# Patient Record
Sex: MALE | Race: BLACK OR AFRICAN AMERICAN | NOT HISPANIC OR LATINO | URBAN - METROPOLITAN AREA
[De-identification: names, ages, dates, MRNs, and addresses within clinical notes are randomized per-mention and may not be internally consistent; named-entity substitution may affect disease eponyms.]

---

## 2021-02-09 ENCOUNTER — INPATIENT (INPATIENT)
Facility: HOSPITAL | Age: 56
LOS: 2 days | Discharge: HOME | DRG: 378 | End: 2021-02-12
Attending: INTERNAL MEDICINE | Admitting: INTERNAL MEDICINE
Payer: COMMERCIAL

## 2021-02-09 VITALS
HEART RATE: 97 BPM | WEIGHT: 250 LBS | TEMPERATURE: 98 F | SYSTOLIC BLOOD PRESSURE: 117 MMHG | DIASTOLIC BLOOD PRESSURE: 80 MMHG | OXYGEN SATURATION: 98 % | RESPIRATION RATE: 16 BRPM

## 2021-02-09 DIAGNOSIS — K92.2 GASTROINTESTINAL HEMORRHAGE, UNSPECIFIED: ICD-10-CM

## 2021-02-09 LAB
ALBUMIN SERPL ELPH-MCNC: 3.3 G/DL — SIGNIFICANT CHANGE UP (ref 3.3–5)
ALP SERPL-CCNC: 60 U/L — SIGNIFICANT CHANGE UP (ref 40–120)
ALT FLD-CCNC: 40 U/L — SIGNIFICANT CHANGE UP (ref 12–78)
ANION GAP SERPL CALC-SCNC: 8 MMOL/L — SIGNIFICANT CHANGE UP (ref 5–17)
APTT BLD: 30.6 SEC — SIGNIFICANT CHANGE UP (ref 27.5–35.5)
AST SERPL-CCNC: 19 U/L — SIGNIFICANT CHANGE UP (ref 15–37)
BASOPHILS # BLD AUTO: 0.04 K/UL — SIGNIFICANT CHANGE UP (ref 0–0.2)
BASOPHILS NFR BLD AUTO: 0.5 % — SIGNIFICANT CHANGE UP (ref 0–2)
BILIRUB SERPL-MCNC: 0.4 MG/DL — SIGNIFICANT CHANGE UP (ref 0.2–1.2)
BUN SERPL-MCNC: 18 MG/DL — SIGNIFICANT CHANGE UP (ref 7–23)
CALCIUM SERPL-MCNC: 8.4 MG/DL — LOW (ref 8.5–10.1)
CHLORIDE SERPL-SCNC: 110 MMOL/L — HIGH (ref 96–108)
CO2 SERPL-SCNC: 22 MMOL/L — SIGNIFICANT CHANGE UP (ref 22–31)
CREAT SERPL-MCNC: 1.08 MG/DL — SIGNIFICANT CHANGE UP (ref 0.5–1.3)
EOSINOPHIL # BLD AUTO: 0.12 K/UL — SIGNIFICANT CHANGE UP (ref 0–0.5)
EOSINOPHIL NFR BLD AUTO: 1.4 % — SIGNIFICANT CHANGE UP (ref 0–6)
GLUCOSE SERPL-MCNC: 130 MG/DL — HIGH (ref 70–99)
HCT VFR BLD CALC: 34.4 % — LOW (ref 39–50)
HCT VFR BLD CALC: 36.6 % — LOW (ref 39–50)
HGB BLD-MCNC: 11.9 G/DL — LOW (ref 13–17)
HGB BLD-MCNC: 12.8 G/DL — LOW (ref 13–17)
IMM GRANULOCYTES NFR BLD AUTO: 0.2 % — SIGNIFICANT CHANGE UP (ref 0–1.5)
INR BLD: 1.22 RATIO — HIGH (ref 0.88–1.16)
LYMPHOCYTES # BLD AUTO: 1.72 K/UL — SIGNIFICANT CHANGE UP (ref 1–3.3)
LYMPHOCYTES # BLD AUTO: 20.6 % — SIGNIFICANT CHANGE UP (ref 13–44)
MCHC RBC-ENTMCNC: 27.6 PG — SIGNIFICANT CHANGE UP (ref 27–34)
MCHC RBC-ENTMCNC: 35 GM/DL — SIGNIFICANT CHANGE UP (ref 32–36)
MCV RBC AUTO: 78.9 FL — LOW (ref 80–100)
MONOCYTES # BLD AUTO: 0.64 K/UL — SIGNIFICANT CHANGE UP (ref 0–0.9)
MONOCYTES NFR BLD AUTO: 7.7 % — SIGNIFICANT CHANGE UP (ref 2–14)
NEUTROPHILS # BLD AUTO: 5.81 K/UL — SIGNIFICANT CHANGE UP (ref 1.8–7.4)
NEUTROPHILS NFR BLD AUTO: 69.6 % — SIGNIFICANT CHANGE UP (ref 43–77)
PLATELET # BLD AUTO: 248 K/UL — SIGNIFICANT CHANGE UP (ref 150–400)
POTASSIUM SERPL-MCNC: 4 MMOL/L — SIGNIFICANT CHANGE UP (ref 3.5–5.3)
POTASSIUM SERPL-SCNC: 4 MMOL/L — SIGNIFICANT CHANGE UP (ref 3.5–5.3)
PROT SERPL-MCNC: 7.1 GM/DL — SIGNIFICANT CHANGE UP (ref 6–8.3)
PROTHROM AB SERPL-ACNC: 14.1 SEC — HIGH (ref 10.6–13.6)
RAPID RVP RESULT: SIGNIFICANT CHANGE UP
RBC # BLD: 4.64 M/UL — SIGNIFICANT CHANGE UP (ref 4.2–5.8)
RBC # FLD: 15.6 % — HIGH (ref 10.3–14.5)
SARS-COV-2 RNA SPEC QL NAA+PROBE: SIGNIFICANT CHANGE UP
SODIUM SERPL-SCNC: 140 MMOL/L — SIGNIFICANT CHANGE UP (ref 135–145)
WBC # BLD: 8.35 K/UL — SIGNIFICANT CHANGE UP (ref 3.8–10.5)
WBC # FLD AUTO: 8.35 K/UL — SIGNIFICANT CHANGE UP (ref 3.8–10.5)

## 2021-02-09 PROCEDURE — 85027 COMPLETE CBC AUTOMATED: CPT

## 2021-02-09 PROCEDURE — 80048 BASIC METABOLIC PNL TOTAL CA: CPT

## 2021-02-09 PROCEDURE — 88312 SPECIAL STAINS GROUP 1: CPT

## 2021-02-09 PROCEDURE — 93010 ELECTROCARDIOGRAM REPORT: CPT

## 2021-02-09 PROCEDURE — 88342 IMHCHEM/IMCYTCHM 1ST ANTB: CPT

## 2021-02-09 PROCEDURE — 86803 HEPATITIS C AB TEST: CPT

## 2021-02-09 PROCEDURE — 36415 COLL VENOUS BLD VENIPUNCTURE: CPT

## 2021-02-09 PROCEDURE — 88305 TISSUE EXAM BY PATHOLOGIST: CPT

## 2021-02-09 PROCEDURE — 74174 CTA ABD&PLVS W/CONTRAST: CPT | Mod: 26

## 2021-02-09 PROCEDURE — 86769 SARS-COV-2 COVID-19 ANTIBODY: CPT

## 2021-02-09 RX ORDER — SODIUM CHLORIDE 9 MG/ML
2000 INJECTION INTRAMUSCULAR; INTRAVENOUS; SUBCUTANEOUS ONCE
Refills: 0 | Status: COMPLETED | OUTPATIENT
Start: 2021-02-09 | End: 2021-02-09

## 2021-02-09 RX ORDER — SODIUM CHLORIDE 9 MG/ML
500 INJECTION INTRAMUSCULAR; INTRAVENOUS; SUBCUTANEOUS ONCE
Refills: 0 | Status: COMPLETED | OUTPATIENT
Start: 2021-02-09 | End: 2021-02-09

## 2021-02-09 RX ADMIN — SODIUM CHLORIDE 1000 MILLILITER(S): 9 INJECTION INTRAMUSCULAR; INTRAVENOUS; SUBCUTANEOUS at 23:19

## 2021-02-09 RX ADMIN — SODIUM CHLORIDE 500 MILLILITER(S): 9 INJECTION INTRAMUSCULAR; INTRAVENOUS; SUBCUTANEOUS at 19:10

## 2021-02-09 RX ADMIN — SODIUM CHLORIDE 500 MILLILITER(S): 9 INJECTION INTRAMUSCULAR; INTRAVENOUS; SUBCUTANEOUS at 18:10

## 2021-02-09 NOTE — ED ADULT NURSE NOTE - OBJECTIVE STATEMENT
pt c/o rectal bleeding that began this AM. Pt endorses constipation and hard stools for which he was placed on stool softener by . will ctm

## 2021-02-09 NOTE — ED PROVIDER NOTE - OBJECTIVE STATEMENT
55 y/o male with PMHx of HTN presents to the ED c/o rectal bleeding. Pt reports BRBPR x 6 episodes started last night. Pt states he woke up at 2:30 AM feeling like he had diarrhea and had an episode of loose stool with BRBPR. Subsequently had 5 more episodes, and felt a little weak and had to ease himself to the floor. Pt was able to get back to bed and went to sleep. Since waking up today has had no further episodes. Denies rectal pain, and fever. No similar sx in past. Does report mild RLQ pain. pain is non radiating and rated it a 5/10. Pt did have hx of colonoscopy in 2017. Normal as per pt.

## 2021-02-09 NOTE — ED PROVIDER NOTE - NS_ ATTENDINGSCRIBEDETAILS _ED_A_ED_FT
Gena Harris MD: The history, relevant review of systems, past medical and surgical history, medical decision making, and physical examination was documented by the scribe in my presence and I attest to the accuracy of the documentation.

## 2021-02-09 NOTE — ED PROVIDER NOTE - CLINICAL SUMMARY MEDICAL DECISION MAKING FREE TEXT BOX
55 y/o with lower GI bleed and abdomen pain. Plan: labs, CTA abdomen and pelvis, fluids, and reassess.

## 2021-02-09 NOTE — ED ADULT NURSE NOTE - CHIEF COMPLAINT QUOTE
Pt p/w c/o BRBPR x 6 times today assc RLQ pain.  Denies blood thinner use, chest pain, shortness of breath.

## 2021-02-09 NOTE — ED STATDOCS - PROGRESS NOTE DETAILS
Zeynep Montes De Oca for attending Dr. Adair: 55 y/o male with a PMHx of HTN presents to the ED c/o rectal bleeding x6 days. Pt notes BRBPR and associated RLQ abdominal pain. Pt also reports sitting on the toilet and attempting to get up and feeling lightheaded and falling. Legs went weak and fell to floor after second episode of rectal bleeding. Notes having 3 more episodes after. Denies head strike, LOC. Notes multiple episodes of BRBPR. Denies chest pain, SOB. Not on blood thinners. No other complaints at this time.  Since pt feeling dizzy, will send pt to main ED for further evaluation.

## 2021-02-09 NOTE — ED PROVIDER NOTE - PROGRESS NOTE DETAILS
Zeynep LESLIE for ED attending, Dr. Villarreal has no bleeding here in the ED. However, pt hasn't ad any PO intake. Pt prefers to go home if medically appropriate. Will give pure liquids, PO challenge, and repeat H&H. Kimberly KILPATRICK: sign out given to Hospitalist, Dr. Iniguez. will admit for lower gi bleed.

## 2021-02-10 LAB
ANION GAP SERPL CALC-SCNC: 3 MMOL/L — LOW (ref 5–17)
BUN SERPL-MCNC: 12 MG/DL — SIGNIFICANT CHANGE UP (ref 7–23)
CALCIUM SERPL-MCNC: 8.4 MG/DL — LOW (ref 8.5–10.1)
CHLORIDE SERPL-SCNC: 115 MMOL/L — HIGH (ref 96–108)
CO2 SERPL-SCNC: 28 MMOL/L — SIGNIFICANT CHANGE UP (ref 22–31)
CREAT SERPL-MCNC: 0.91 MG/DL — SIGNIFICANT CHANGE UP (ref 0.5–1.3)
GLUCOSE SERPL-MCNC: 98 MG/DL — SIGNIFICANT CHANGE UP (ref 70–99)
HCT VFR BLD CALC: 29.3 % — LOW (ref 39–50)
HCT VFR BLD CALC: 32.2 % — LOW (ref 39–50)
HGB BLD-MCNC: 10.1 G/DL — LOW (ref 13–17)
HGB BLD-MCNC: 10.9 G/DL — LOW (ref 13–17)
MCHC RBC-ENTMCNC: 27.2 PG — SIGNIFICANT CHANGE UP (ref 27–34)
MCHC RBC-ENTMCNC: 27.7 PG — SIGNIFICANT CHANGE UP (ref 27–34)
MCHC RBC-ENTMCNC: 33.9 GM/DL — SIGNIFICANT CHANGE UP (ref 32–36)
MCHC RBC-ENTMCNC: 34.5 GM/DL — SIGNIFICANT CHANGE UP (ref 32–36)
MCV RBC AUTO: 80.3 FL — SIGNIFICANT CHANGE UP (ref 80–100)
MCV RBC AUTO: 80.3 FL — SIGNIFICANT CHANGE UP (ref 80–100)
PLATELET # BLD AUTO: 207 K/UL — SIGNIFICANT CHANGE UP (ref 150–400)
PLATELET # BLD AUTO: 211 K/UL — SIGNIFICANT CHANGE UP (ref 150–400)
POTASSIUM SERPL-MCNC: 3.9 MMOL/L — SIGNIFICANT CHANGE UP (ref 3.5–5.3)
POTASSIUM SERPL-SCNC: 3.9 MMOL/L — SIGNIFICANT CHANGE UP (ref 3.5–5.3)
RBC # BLD: 3.65 M/UL — LOW (ref 4.2–5.8)
RBC # BLD: 4.01 M/UL — LOW (ref 4.2–5.8)
RBC # FLD: 15.8 % — HIGH (ref 10.3–14.5)
RBC # FLD: 15.9 % — HIGH (ref 10.3–14.5)
SARS-COV-2 IGG SERPL QL IA: NEGATIVE — SIGNIFICANT CHANGE UP
SARS-COV-2 IGM SERPL IA-ACNC: 0.06 INDEX — SIGNIFICANT CHANGE UP
SODIUM SERPL-SCNC: 146 MMOL/L — HIGH (ref 135–145)
WBC # BLD: 6.05 K/UL — SIGNIFICANT CHANGE UP (ref 3.8–10.5)
WBC # BLD: 6.53 K/UL — SIGNIFICANT CHANGE UP (ref 3.8–10.5)
WBC # FLD AUTO: 6.05 K/UL — SIGNIFICANT CHANGE UP (ref 3.8–10.5)
WBC # FLD AUTO: 6.53 K/UL — SIGNIFICANT CHANGE UP (ref 3.8–10.5)

## 2021-02-10 PROCEDURE — 99223 1ST HOSP IP/OBS HIGH 75: CPT

## 2021-02-10 RX ORDER — ONDANSETRON 8 MG/1
4 TABLET, FILM COATED ORAL EVERY 6 HOURS
Refills: 0 | Status: DISCONTINUED | OUTPATIENT
Start: 2021-02-10 | End: 2021-02-12

## 2021-02-10 RX ORDER — GABAPENTIN 400 MG/1
200 CAPSULE ORAL DAILY
Refills: 0 | Status: DISCONTINUED | OUTPATIENT
Start: 2021-02-10 | End: 2021-02-10

## 2021-02-10 RX ORDER — SOD SULF/SODIUM/NAHCO3/KCL/PEG
2000 SOLUTION, RECONSTITUTED, ORAL ORAL ONCE
Refills: 0 | Status: COMPLETED | OUTPATIENT
Start: 2021-02-10 | End: 2021-02-10

## 2021-02-10 RX ORDER — OXYBUTYNIN CHLORIDE 5 MG
5 TABLET ORAL
Refills: 0 | Status: DISCONTINUED | OUTPATIENT
Start: 2021-02-10 | End: 2021-02-12

## 2021-02-10 RX ORDER — OXYBUTYNIN CHLORIDE 5 MG
10 TABLET ORAL DAILY
Refills: 0 | Status: DISCONTINUED | OUTPATIENT
Start: 2021-02-10 | End: 2021-02-10

## 2021-02-10 RX ORDER — TAMSULOSIN HYDROCHLORIDE 0.4 MG/1
0.4 CAPSULE ORAL AT BEDTIME
Refills: 0 | Status: DISCONTINUED | OUTPATIENT
Start: 2021-02-10 | End: 2021-02-12

## 2021-02-10 RX ORDER — ACETAMINOPHEN 500 MG
650 TABLET ORAL EVERY 6 HOURS
Refills: 0 | Status: DISCONTINUED | OUTPATIENT
Start: 2021-02-10 | End: 2021-02-12

## 2021-02-10 RX ADMIN — Medication 5 MILLIGRAM(S): at 20:39

## 2021-02-10 RX ADMIN — TAMSULOSIN HYDROCHLORIDE 0.4 MILLIGRAM(S): 0.4 CAPSULE ORAL at 20:38

## 2021-02-10 RX ADMIN — Medication 2000 MILLILITER(S): at 18:44

## 2021-02-10 NOTE — H&P ADULT - NSHPPHYSICALEXAM_GEN_ALL_CORE
Vital Signs Last 24 Hrs  T(C): 36.4 (10 Feb 2021 10:09), Max: 36.7 (09 Feb 2021 18:26)  T(F): 97.5 (10 Feb 2021 10:09), Max: 98.1 (09 Feb 2021 18:26)  HR: 71 (10 Feb 2021 10:09) (71 - 97)  BP: 105/71 (10 Feb 2021 10:09) (99/64 - 118/78)  BP(mean): 72 (09 Feb 2021 18:26) (72 - 90)  RR: 18 (10 Feb 2021 10:09) (15 - 18)  SpO2: 97% (10 Feb 2021 10:09) (97% - 100%)        · CONSTITUTIONAL: Well appearing, awake, alert, oriented to person, place, time/situation and in no apparent distress.  · ENMT: Airway patent, Nasal mucosa clear. Mouth with normal mucosa. Throat has no vesicles, no oropharyngeal exudates and uvula is midline.  · EYES: Clear bilaterally, pupils equal, round and reactive to light.  · CARDIAC: Normal rate, regular rhythm.  Heart sounds S1, S2.  No murmurs, rubs or gallops.  · RESPIRATORY: Breath sounds clear and equal bilaterally.  · GASTROINTESTINAL: Abdomen soft, no  + tenderness to RLQ and mid lower abdomen  · MUSCULOSKELETAL: Spine appears normal, range of motion is not limited, no muscle or joint tenderness  · NEUROLOGICAL: Alert and oriented, no focal deficits, no motor or sensory deficits.  · SKIN: Skin normal color for race, warm, dry and intact. No evidence of rash.

## 2021-02-10 NOTE — H&P ADULT - NSHPLABSRESULTS_GEN_ALL_CORE
10.9   6.53  )-----------( 211      ( 10 Feb 2021 10:45 )             32.2     10 Feb 2021 10:45    146    |  115    |  12     ----------------------------<  98     3.9     |  28     |  0.91     Ca    8.4        10 Feb 2021 10:45    TPro  7.1    /  Alb  3.3    /  TBili  0.4    /  DBili  x      /  AST  19     /  ALT  40     /  AlkPhos  60     09 Feb 2021 18:05    LIVER FUNCTIONS - ( 09 Feb 2021 18:05 )  Alb: 3.3 g/dL / Pro: 7.1 gm/dL / ALK PHOS: 60 U/L / ALT: 40 U/L / AST: 19 U/L / GGT: x           PT/INR - ( 09 Feb 2021 18:05 )   PT: 14.1 sec;   INR: 1.22 ratio         PTT - ( 09 Feb 2021 18:05 )  PTT:30.6 sec  CAPILLARY BLOOD GLUCOSE

## 2021-02-10 NOTE — CONSULT NOTE ADULT - SUBJECTIVE AND OBJECTIVE BOX
Patient is a 56y old  Male who presents with a chief complaint of Rectal bleed (10 Feb 2021 11:30)      HPI:   55 y/o male with PMHx of HTN presented to the ED c/o rectal bleeding. Pt reports BRBPR x 6 episodes started 2 days ago. Pt states he woke up very early yesterday  at 2:30 AM feeling like he had diarrhea and had an episode of loose stool with large BRBPR. Subsequently had 5 more episodes, and felt a little weak and had to ease himself to the floor. Pt was able to get back to bed and went to sleep. Since waking up yesterday has had no further episodes. He denies any abd pain, nausea, vomiting, and fever. No similar episodes in the past. He had routine colonoscopy in 2017 by his GI Dr Lopez and was normal as per patient.        (10 Feb 2021 11:30)  Patient states that prior colonoscopy was normal.  Denies any abdominal pain.  Please that the bleeding was bright red blood.    PAST MEDICAL & SURGICAL HISTORY:  HTN (hypertension)    No significant past surgical history        MEDICATIONS  (STANDING):  oxybutynin 5 milliGRAM(s) Oral two times a day  polyethylene glycol/electrolyte Solution 2000 milliLiter(s) Oral once  tamsulosin 0.4 milliGRAM(s) Oral at bedtime    MEDICATIONS  (PRN):  acetaminophen   Tablet .. 650 milliGRAM(s) Oral every 6 hours PRN Temp greater or equal to 38C (100.4F), Mild Pain (1 - 3)  ondansetron Injectable 4 milliGRAM(s) IV Push every 6 hours PRN Nausea and/or Vomiting      Allergies    No Known Allergies    Intolerances        SOCIAL HISTORY:[employer]    FAMILY HISTORY:  NC    REVIEW OF SYSTEMS:    CONSTITUTIONAL: No weakness, fevers or chills  EYES/ENT: No visual changes;  No vertigo or throat pain   NECK: No pain or stiffness  RESPIRATORY: No cough, wheezing, hemoptysis; No shortness of breath  CARDIOVASCULAR: No chest pain or palpitations  GENITOURINARY: No dysuria, frequency or hematuria  NEUROLOGICAL: No numbness or weakness  SKIN: No itching, burning, rashes, or lesions   All other review of systems is negative unless indicated above.    Vital Signs Last 24 Hrs  T(C): 36.6 (10 Feb 2021 16:09), Max: 36.7 (09 Feb 2021 18:26)  T(F): 97.8 (10 Feb 2021 16:09), Max: 98.1 (09 Feb 2021 18:26)  HR: 86 (10 Feb 2021 16:09) (71 - 89)  BP: 107/68 (10 Feb 2021 16:09) (99/64 - 118/78)  BP(mean): 72 (09 Feb 2021 18:26) (72 - 72)  RR: 18 (10 Feb 2021 16:09) (15 - 18)  SpO2: 98% (10 Feb 2021 16:09) (97% - 100%)    PHYSICAL EXAM:    Constitutional: NAD, well-developed  HEENT: EOMI, throat clear  Neck: No LAD, supple  Respiratory: CTA and P  Cardiovascular: S1 and S2, RRR, no M  Gastrointestinal: BS+, soft, NT/ND, neg HSM,  Extremities: No peripheral edema, neg clubing, cyanosis  Vascular: 2+ peripheral pulses  Neurological: A/O x 3, no focal deficits  Psychiatric: Normal mood, normal affect  Skin: No rashes    LABS:  CBC Full  -  ( 10 Feb 2021 10:45 )  WBC Count : 6.53 K/uL  RBC Count : 4.01 M/uL  Hemoglobin : 10.9 g/dL  Hematocrit : 32.2 %  Platelet Count - Automated : 211 K/uL  Mean Cell Volume : 80.3 fl  Mean Cell Hemoglobin : 27.2 pg  Mean Cell Hemoglobin Concentration : 33.9 gm/dL  Auto Neutrophil # : x  Auto Lymphocyte # : x  Auto Monocyte # : x  Auto Eosinophil # : x  Auto Basophil # : x  Auto Neutrophil % : x  Auto Lymphocyte % : x  Auto Monocyte % : x  Auto Eosinophil % : x  Auto Basophil % : x    02-10    146<H>  |  115<H>  |  12  ----------------------------<  98  3.9   |  28  |  0.91    Ca    8.4<L>      10 Feb 2021 10:45    TPro  7.1  /  Alb  3.3  /  TBili  0.4  /  DBili  x   /  AST  19  /  ALT  40  /  AlkPhos  60  02-09    PT/INR - ( 09 Feb 2021 18:05 )   PT: 14.1 sec;   INR: 1.22 ratio         PTT - ( 09 Feb 2021 18:05 )  PTT:30.6 sec        RADIOLOGY & ADDITIONAL STUDIES:  < from: CT Angio Abdomen and Pelvis w/ IV Cont (02.09.21 @ 19:40) >  EXAM:  CT ANGIO ABD PELV (W)AW IC                            PROCEDURE DATE:  02/09/2021          INTERPRETATION:  CLINICAL INFORMATION: 6 episodes of bright red blood per rectum    COMPARISON: None.    PROCEDURE:  CT Angiography of the Abdomen andPelvis.  Precontrast, Arterial and Delayed phases were acquired.  Intravenous contrast: 90 ml Omnipaque 350. 10 ml discarded.  Oral contrast: None.  Sagittal and coronal reformats were performed as well as 3D (MIP) reconstructions.    FINDINGS:  LOWER CHEST: Clear.    LIVER: Normal.  BILE DUCTS: Nondilated.  GALLBLADDER: Normal.  SPLEEN: Normal.  PANCREAS: Normal.  ADRENALS: Normal.  KIDNEYS/URETERS: No calculi, hydronephrosis, or soft tissue attenuating mass.    BLADDER: Normal.  REPRODUCTIVE ORGANS: Unremarkable prostate and seminal vesicles.    BOWEL: No bowel-related abnormality. Specifically, no evidence of acute diverticulitis. Normal appendix and ileocecal region. No bowel obstruction or bowel inflammation. No active bleeding.  PERITONEUM: No free air or ascites.  VESSELS: Normal caliber aorta.  RETROPERITONEUM/LYMPH NODES: No adenopathy.  ABDOMINAL WALL: Normal.  BONES: No acute bony abnormality.    IMPRESSION:  *  No acute pathology. No active bleeding.            JOLENE SAMANO MD;Attending Radiologist  This document has been electronically signed. Feb 9 2021  7:59PM    < end of copied text >

## 2021-02-10 NOTE — CONSULT NOTE ADULT - ASSESSMENT
57 y/o male with PMHx of HTN presented to the ED c/o rectal bleeding. Pt reports BRBPR x 6 episodes started 2 days ago. Pt states he woke up very early yesterday  at 2:30 AM feeling like he had diarrhea and had an episode of loose stool with large BRBPR. Subsequently had 5 more episodes, and felt a little weak and had to ease himself to the floor. Pt was able to get back to bed and went to sleep. Since waking up yesterday has had no further episodes. He denies any abd pain, nausea, vomiting, and fever. No similar episodes in the past. He had routine colonoscopy in 2017 by his GI Dr Lopez and was normal as per patient.     GI bleed–likely lower  Serial H&H  Clear liquids and plan on colonoscopy after preparation.  Possibly diverticular bleed versus other sources discussed.  Possible of an upper GI bleed also reviewed.    Colonoscopy alternatives benefits and risks reviewed with patient

## 2021-02-10 NOTE — H&P ADULT - ASSESSMENT
57 y/o male with PMHx of HTN presented to the ED c/o rectal bleeding. Pt reports BRBPR x 6 episodes started 2 days ago. Pt states he woke up very early yesterday  at 2:30 AM feeling like he had diarrhea and had an episode of loose stool with large BRBPR. Subsequently had 5 more episodes, and felt a little weak and had to ease himself to the floor. Pt was able to get back to bed and went to sleep. Since waking up yesterday has had no further episodes. He denies any abd pain, nausea, vomiting, and fever. No similar episodes in the past. He had routine colonoscopy in 2017 by his GI Dr Lopez and was normal as per patient.     1. Lower GI bleed-possibly diverticular bleed  Acute blood loss anemia -lost 2 gms since last evening.  -Admit to floor  -Clears for now  -GI eval for possible colonoscopy  -Follow H/H  -Hold all BP mediations   -Hemodynamically stable    2. HTN-stable  Hold norvasc and cozaar due to GI bleed    3. BPH-  Continue flomax

## 2021-02-10 NOTE — H&P ADULT - HISTORY OF PRESENT ILLNESS
57 y/o male with PMHx of HTN presented to the ED c/o rectal bleeding. Pt reports BRBPR x 6 episodes started 2 days ago. Pt states he woke up very early yesterday  at 2:30 AM feeling like he had diarrhea and had an episode of loose stool with large BRBPR. Subsequently had 5 more episodes, and felt a little weak and had to ease himself to the floor. Pt was able to get back to bed and went to sleep. Since waking up yesterday has had no further episodes. He denies any abd pain, nausea, vomiting, and fever. No similar episodes in the past. He had routine colonoscopy in 2017 by his GI Dr Lopez and was normal as per patient.

## 2021-02-11 LAB
HCT VFR BLD CALC: 32.5 % — LOW (ref 39–50)
HCV AB S/CO SERPL IA: 0.07 S/CO — SIGNIFICANT CHANGE UP (ref 0–0.99)
HCV AB SERPL-IMP: SIGNIFICANT CHANGE UP
HGB BLD-MCNC: 11.3 G/DL — LOW (ref 13–17)
MCHC RBC-ENTMCNC: 28.1 PG — SIGNIFICANT CHANGE UP (ref 27–34)
MCHC RBC-ENTMCNC: 34.8 GM/DL — SIGNIFICANT CHANGE UP (ref 32–36)
MCV RBC AUTO: 80.8 FL — SIGNIFICANT CHANGE UP (ref 80–100)
PLATELET # BLD AUTO: 216 K/UL — SIGNIFICANT CHANGE UP (ref 150–400)
RBC # BLD: 4.02 M/UL — LOW (ref 4.2–5.8)
RBC # FLD: 16.2 % — HIGH (ref 10.3–14.5)
WBC # BLD: 6.1 K/UL — SIGNIFICANT CHANGE UP (ref 3.8–10.5)
WBC # FLD AUTO: 6.1 K/UL — SIGNIFICANT CHANGE UP (ref 3.8–10.5)

## 2021-02-11 PROCEDURE — 88305 TISSUE EXAM BY PATHOLOGIST: CPT | Mod: 26

## 2021-02-11 PROCEDURE — 99233 SBSQ HOSP IP/OBS HIGH 50: CPT

## 2021-02-11 PROCEDURE — 88312 SPECIAL STAINS GROUP 1: CPT | Mod: 26

## 2021-02-11 PROCEDURE — 88342 IMHCHEM/IMCYTCHM 1ST ANTB: CPT | Mod: 26

## 2021-02-11 RX ADMIN — TAMSULOSIN HYDROCHLORIDE 0.4 MILLIGRAM(S): 0.4 CAPSULE ORAL at 20:36

## 2021-02-11 RX ADMIN — Medication 5 MILLIGRAM(S): at 09:12

## 2021-02-11 RX ADMIN — Medication 5 MILLIGRAM(S): at 20:35

## 2021-02-12 VITALS
SYSTOLIC BLOOD PRESSURE: 117 MMHG | RESPIRATION RATE: 18 BRPM | OXYGEN SATURATION: 98 % | TEMPERATURE: 98 F | DIASTOLIC BLOOD PRESSURE: 76 MMHG | HEART RATE: 88 BPM

## 2021-02-12 LAB
HCT VFR BLD CALC: 29.7 % — LOW (ref 39–50)
HGB BLD-MCNC: 10.1 G/DL — LOW (ref 13–17)
MCHC RBC-ENTMCNC: 27.3 PG — SIGNIFICANT CHANGE UP (ref 27–34)
MCHC RBC-ENTMCNC: 34 GM/DL — SIGNIFICANT CHANGE UP (ref 32–36)
MCV RBC AUTO: 80.3 FL — SIGNIFICANT CHANGE UP (ref 80–100)
PLATELET # BLD AUTO: 219 K/UL — SIGNIFICANT CHANGE UP (ref 150–400)
RBC # BLD: 3.7 M/UL — LOW (ref 4.2–5.8)
RBC # FLD: 15.9 % — HIGH (ref 10.3–14.5)
WBC # BLD: 6.11 K/UL — SIGNIFICANT CHANGE UP (ref 3.8–10.5)
WBC # FLD AUTO: 6.11 K/UL — SIGNIFICANT CHANGE UP (ref 3.8–10.5)

## 2021-02-12 PROCEDURE — 99239 HOSP IP/OBS DSCHRG MGMT >30: CPT

## 2021-02-12 RX ORDER — TAMSULOSIN HYDROCHLORIDE 0.4 MG/1
1 CAPSULE ORAL
Qty: 0 | Refills: 0 | DISCHARGE

## 2021-02-12 RX ORDER — DOCUSATE SODIUM 100 MG
1 CAPSULE ORAL
Qty: 0 | Refills: 0 | DISCHARGE

## 2021-02-12 RX ORDER — LOSARTAN POTASSIUM 100 MG/1
1 TABLET, FILM COATED ORAL
Qty: 0 | Refills: 0 | DISCHARGE

## 2021-02-12 RX ORDER — SOD,AMMONIUM,POTASSIUM LACTATE
1 CREAM (GRAM) TOPICAL
Qty: 0 | Refills: 0 | DISCHARGE

## 2021-02-12 RX ORDER — CHOLECALCIFEROL (VITAMIN D3) 125 MCG
1 CAPSULE ORAL
Qty: 0 | Refills: 0 | DISCHARGE

## 2021-02-12 RX ORDER — GABAPENTIN 400 MG/1
2 CAPSULE ORAL
Qty: 0 | Refills: 0 | DISCHARGE

## 2021-02-12 RX ORDER — OXYBUTYNIN CHLORIDE 5 MG
1 TABLET ORAL
Qty: 0 | Refills: 0 | DISCHARGE

## 2021-02-12 RX ORDER — AMLODIPINE BESYLATE 2.5 MG/1
1 TABLET ORAL
Qty: 0 | Refills: 0 | DISCHARGE

## 2021-02-12 RX ADMIN — Medication 5 MILLIGRAM(S): at 09:08

## 2021-02-12 NOTE — DISCHARGE NOTE PROVIDER - NSDCCPCAREPLAN_GEN_ALL_CORE_FT
PRINCIPAL DISCHARGE DIAGNOSIS  Diagnosis: Lower GI bleed  Assessment and Plan of Treatment: Follow up with pmd

## 2021-02-12 NOTE — PROGRESS NOTE ADULT - SUBJECTIVE AND OBJECTIVE BOX
Patient is a 56y old  Male who presents with a chief complaint of Rectal bleed (11 Feb 2021 14:13)      HPI:   57 y/o male with PMHx of HTN presented to the ED c/o rectal bleeding. Pt reports BRBPR x 6 episodes started 2 days ago. Pt states he woke up very early yesterday  at 2:30 AM feeling like he had diarrhea and had an episode of loose stool with large BRBPR. Subsequently had 5 more episodes, and felt a little weak and had to ease himself to the floor. Pt was able to get back to bed and went to sleep. Since waking up yesterday has had no further episodes. He denies any abd pain, nausea, vomiting, and fever. No similar episodes in the past. He had routine colonoscopy in 2017 by his GI Dr Lopez and was normal as per patient.        (10 Feb 2021 11:30)  Patient comfortable.  No further bleeding.  Negative abdominal pain.  Tolerating diet.      PAST MEDICAL & SURGICAL HISTORY:  HTN (hypertension)    No significant past surgical history        MEDICATIONS  (STANDING):  oxybutynin 5 milliGRAM(s) Oral two times a day  tamsulosin 0.4 milliGRAM(s) Oral at bedtime    MEDICATIONS  (PRN):  acetaminophen   Tablet .. 650 milliGRAM(s) Oral every 6 hours PRN Temp greater or equal to 38C (100.4F), Mild Pain (1 - 3)  ondansetron Injectable 4 milliGRAM(s) IV Push every 6 hours PRN Nausea and/or Vomiting      Allergies    No Known Allergies    Intolerances        SOCIAL HISTORY:Neg drugs    FAMILY HISTORY:NC      REVIEW OF SYSTEMS:    CONSTITUTIONAL: No weakness, fevers or chills  EYES/ENT: No visual changes;  No vertigo or throat pain   NECK: No pain or stiffness  RESPIRATORY: No cough, wheezing, hemoptysis; No shortness of breath  CARDIOVASCULAR: No chest pain or palpitations  GENITOURINARY: No dysuria, frequency or hematuria  NEUROLOGICAL: No numbness or weakness  SKIN: No itching, burning, rashes, or lesions   All other review of systems is negative unless indicated above.    Vital Signs Last 24 Hrs  T(C): 36.8 (12 Feb 2021 07:48), Max: 36.8 (12 Feb 2021 07:48)  T(F): 98.2 (12 Feb 2021 07:48), Max: 98.2 (12 Feb 2021 07:48)  HR: 88 (12 Feb 2021 07:48) (83 - 88)  BP: 117/76 (12 Feb 2021 07:48) (109/69 - 117/76)  BP(mean): --  RR: 18 (12 Feb 2021 07:48) (18 - 18)  SpO2: 98% (12 Feb 2021 07:48) (98% - 100%)    PHYSICAL EXAM:    Constitutional: NAD, well-developed  HEENT: EOMI, throat clear  Neck: No LAD, supple  Respiratory: CTA and P  Cardiovascular: S1 and S2, RRR, no M  Gastrointestinal: BS+, soft, NT/ND, neg HSM,  Extremities: No peripheral edema, neg clubing, cyanosis  Vascular: 2+ peripheral pulses  Neurological: A/O x 3, no focal deficits  Psychiatric: Normal mood, normal affect  Skin: No rashes    LABS:  CBC Full  -  ( 12 Feb 2021 07:25 )  WBC Count : 6.11 K/uL  RBC Count : 3.70 M/uL  Hemoglobin : 10.1 g/dL  Hematocrit : 29.7 %  Platelet Count - Automated : 219 K/uL  Mean Cell Volume : 80.3 fl  Mean Cell Hemoglobin : 27.3 pg  Mean Cell Hemoglobin Concentration : 34.0 gm/dL  Auto Neutrophil # : x  Auto Lymphocyte # : x  Auto Monocyte # : x  Auto Eosinophil # : x  Auto Basophil # : x  Auto Neutrophil % : x  Auto Lymphocyte % : x  Auto Monocyte % : x  Auto Eosinophil % : x  Auto Basophil % : x    02-10    146<H>  |  115<H>  |  12  ----------------------------<  98  3.9   |  28  |  0.91    Ca    8.4<L>      10 Feb 2021 10:45              RADIOLOGY & ADDITIONAL STUDIES:  < from: CT Angio Abdomen and Pelvis w/ IV Cont (02.09.21 @ 19:40) >  EXAM:  CT ANGIO ABD PELV (W)AW IC                            PROCEDURE DATE:  02/09/2021          INTERPRETATION:  CLINICAL INFORMATION: 6 episodes of bright red blood per rectum    COMPARISON: None.    PROCEDURE:  CT Angiography of the Abdomen andPelvis.  Precontrast, Arterial and Delayed phases were acquired.  Intravenous contrast: 90 ml Omnipaque 350. 10 ml discarded.  Oral contrast: None.  Sagittal and coronal reformats were performed as well as 3D (MIP) reconstructions.    FINDINGS:  LOWER CHEST: Clear.    LIVER: Normal.  BILE DUCTS: Nondilated.  GALLBLADDER: Normal.  SPLEEN: Normal.  PANCREAS: Normal.  ADRENALS: Normal.  KIDNEYS/URETERS: No calculi, hydronephrosis, or soft tissue attenuating mass.    BLADDER: Normal.  REPRODUCTIVE ORGANS: Unremarkable prostate and seminal vesicles.    BOWEL: No bowel-related abnormality. Specifically, no evidence of acute diverticulitis. Normal appendix and ileocecal region. No bowel obstruction or bowel inflammation. No active bleeding.  PERITONEUM: No free air or ascites.  VESSELS: Normal caliber aorta.  RETROPERITONEUM/LYMPH NODES: No adenopathy.  ABDOMINAL WALL: Normal.  BONES: No acute bony abnormality.    IMPRESSION:  *  No acute pathology. No active bleeding.            JOLENE SAMANO MD;Attending Radiologist  This document has been electronically signed. Feb 9 2021  7:59PM    < end of copied text >  
55 y/o male with PMHx of HTN presented to the ED c/o rectal bleeding. Pt reports BRBPR x 6 episodes started 2 days ago. Pt states he woke up very early yesterday  at 2:30 AM feeling like he had diarrhea and had an episode of loose stool with large BRBPR. Subsequently had 5 more episodes, and felt a little weak and had to ease himself to the floor. Pt was able to get back to bed and went to sleep. Since waking up yesterday has had no further episodes. He denies any abd pain, nausea, vomiting, and fever. No similar episodes in the past. He had routine colonoscopy in 2017 by his GI Dr Lopez and was normal as per patient.         02/11/21: Patient seen and examined. S/P EGD and colonoscopy in am, diverticulosis and 2 colonic polyps were removed. No more rectal bleeding since admission. Discussed with Dr Mckay. Discussed with patient regarding management and d/c plan.         Vital Signs Last 24 Hrs  T(C): 36.8 (11 Feb 2021 09:10), Max: 36.8 (11 Feb 2021 09:10)  T(F): 98.2 (11 Feb 2021 09:10), Max: 98.2 (11 Feb 2021 09:10)  HR: 92 (11 Feb 2021 09:10) (80 - 92)  BP: 127/95 (11 Feb 2021 09:10) (107/68 - 127/95)  BP(mean): --  RR: 17 (11 Feb 2021 09:10) (17 - 18)  SpO2: 100% (11 Feb 2021 09:10) (98% - 100%)      Physical Exam:       · CONSTITUTIONAL: Well appearing, awake, alert, oriented to person, place, time/situation and in no apparent distress.  · ENMT: Airway patent, Nasal mucosa clear. Mouth with normal mucosa. Throat has no vesicles, no oropharyngeal exudates and uvula is midline.  · EYES: Clear bilaterally, pupils equal, round and reactive to light.  · CARDIAC: Normal rate, regular rhythm.  Heart sounds S1, S2.  No murmurs, rubs or gallops.  · RESPIRATORY: Breath sounds clear and equal bilaterally.  · GASTROINTESTINAL: Abdomen soft, no  + tenderness to RLQ and mid lower abdomen  · MUSCULOSKELETAL: Spine appears normal, range of motion is not limited, no muscle or joint tenderness  · NEUROLOGICAL: Alert and oriented, no focal deficits, no motor or sensory deficits.  · SKIN: Skin normal color for race, warm, dry and intact. No evidence of rash.                                  11.3   6.10  )-----------( 216      ( 11 Feb 2021 09:18 )             32.5     10 Feb 2021 10:45    146    |  115    |  12     ----------------------------<  98     3.9     |  28     |  0.91     Ca    8.4        10 Feb 2021 10:45    TPro  7.1    /  Alb  3.3    /  TBili  0.4    /  DBili  x      /  AST  19     /  ALT  40     /  AlkPhos  60     09 Feb 2021 18:05    LIVER FUNCTIONS - ( 09 Feb 2021 18:05 )  Alb: 3.3 g/dL / Pro: 7.1 gm/dL / ALK PHOS: 60 U/L / ALT: 40 U/L / AST: 19 U/L / GGT: x           PT/INR - ( 09 Feb 2021 18:05 )   PT: 14.1 sec;   INR: 1.22 ratio         PTT - ( 09 Feb 2021 18:05 )  PTT:30.6 sec  CAPILLARY BLOOD GLUCOSE              MEDICATIONS  (STANDING):  oxybutynin 5 milliGRAM(s) Oral two times a day  tamsulosin 0.4 milliGRAM(s) Oral at bedtime    MEDICATIONS  (PRN):  acetaminophen   Tablet .. 650 milliGRAM(s) Oral every 6 hours PRN Temp greater or equal to 38C (100.4F), Mild Pain (1 - 3)  ondansetron Injectable 4 milliGRAM(s) IV Push every 6 hours PRN Nausea and/or Vomiting              Assessment and Plan:   Assessment:  · Assessment	   55 y/o male with PMHx of HTN presented to the ED c/o rectal bleeding. Pt reports BRBPR x 6 episodes started 2 days ago. Pt states he woke up very early yesterday  at 2:30 AM feeling like he had diarrhea and had an episode of loose stool with large BRBPR. Subsequently had 5 more episodes, and felt a little weak and had to ease himself to the floor. Pt was able to get back to bed and went to sleep. Since waking up yesterday has had no further episodes. He denies any abd pain, nausea, vomiting, and fever. No similar episodes in the past. He had routine colonoscopy in 2017 by his GI Dr Lopez and was normal as per patient.     1. Lower GI bleed-possibly diverticular bleed  Acute blood loss anemia  -S/P EGD and colonoscopy: diverticulosis and 2 polyps removed  -Follow H/H  -Hold all BP mediations   -Hemodynamically stable    2. HTN-stable  Hold norvasc and cozaar due to GI bleed    3. BPH-  Continue flomax      Possible home tomorrow.

## 2021-02-12 NOTE — PROGRESS NOTE ADULT - ASSESSMENT
GI bleed    Possibly diverticular.  Patient had pandiverticulosis on colonoscopy.  2 small polyps were also removed.  We will consider surveillance colonoscopy at 5 years.    Endoscopy with 17 mm esophageal stricture and a medium sized hiatal hernia.  Biopsies for gastritis performed.    Follow-up as outpatient.  Possibility of small intestinal source discussed with patient.  Importance of follow-up and risk of misdiagnosis without follow-up discussed

## 2021-02-12 NOTE — DISCHARGE NOTE NURSING/CASE MANAGEMENT/SOCIAL WORK - PATIENT PORTAL LINK FT
You can access the FollowMyHealth Patient Portal offered by [facility] by registering at the following website: [email or web address]. By joining [employer]’s FollowMyHealth portal, you will also be able to view your health information using other applications (apps) compatible with our system.

## 2021-02-12 NOTE — DISCHARGE NOTE PROVIDER - NSDCMRMEDTOKEN_GEN_ALL_CORE_FT
amLODIPine 5 mg oral tablet: 1 tab(s) orally once a day  ***DrFirst***  ammonium lactate 12% topical lotion: Apply topically to affected area 2 times a day  ***DrFirst***  losartan 100 mg oral tablet: 1 tab(s) orally once a day  ***DrFirst***  oxybutynin 10 mg/24 hr oral tablet, extended release: 1 tab(s) orally once a day  ***DrFirst***  tamsulosin 0.4 mg oral capsule: 1 cap(s) orally once a day  ***DrFirst***  Vitamin D3 2000 intl units (50 mcg) oral tablet: 1 tab(s) orally once a day  ***DrFirst***   amLODIPine 5 mg oral tablet: 1 tab(s) orally once a day    losartan 100 mg oral tablet: 1 tab(s) orally once a day    oxybutynin 10 mg/24 hr oral tablet, extended release: 1 tab(s) orally once a day    tamsulosin 0.4 mg oral capsule: 1 cap(s) orally once a day    Vitamin D3 2000 intl units (50 mcg) oral tablet: 1 tab(s) orally once a day

## 2021-02-18 DIAGNOSIS — K57.91 DIVERTICULOSIS OF INTESTINE, PART UNSPECIFIED, WITHOUT PERFORATION OR ABSCESS WITH BLEEDING: ICD-10-CM

## 2021-02-18 DIAGNOSIS — D12.7 BENIGN NEOPLASM OF RECTOSIGMOID JUNCTION: ICD-10-CM

## 2021-02-18 DIAGNOSIS — K22.2 ESOPHAGEAL OBSTRUCTION: ICD-10-CM

## 2021-02-18 DIAGNOSIS — K31.89 OTHER DISEASES OF STOMACH AND DUODENUM: ICD-10-CM

## 2021-02-18 DIAGNOSIS — K44.9 DIAPHRAGMATIC HERNIA WITHOUT OBSTRUCTION OR GANGRENE: ICD-10-CM

## 2021-02-18 DIAGNOSIS — D62 ACUTE POSTHEMORRHAGIC ANEMIA: ICD-10-CM

## 2021-02-18 DIAGNOSIS — N40.0 BENIGN PROSTATIC HYPERPLASIA WITHOUT LOWER URINARY TRACT SYMPTOMS: ICD-10-CM

## 2021-02-18 DIAGNOSIS — K62.5 HEMORRHAGE OF ANUS AND RECTUM: ICD-10-CM

## 2021-02-18 DIAGNOSIS — I10 ESSENTIAL (PRIMARY) HYPERTENSION: ICD-10-CM

## 2021-02-18 DIAGNOSIS — D12.4 BENIGN NEOPLASM OF DESCENDING COLON: ICD-10-CM

## 2021-02-18 DIAGNOSIS — K64.9 UNSPECIFIED HEMORRHOIDS: ICD-10-CM

## 2022-01-27 NOTE — ED PROVIDER NOTE - CONSTITUTIONAL [+], MLM
Pharmacy's message that prescriber is not enrolled in state medicaid. Please resend new Rx with a different prescriber.      Please advise and please make sure to put the faculty's name or preceptor's name on the authorizing providers.    Thank you,    ELIZABETH PenaA     +weakness

## 2022-08-06 ENCOUNTER — EMERGENCY (EMERGENCY)
Facility: HOSPITAL | Age: 57
LOS: 0 days | Discharge: HOME | End: 2022-08-06
Attending: HOSPITALIST
Payer: COMMERCIAL

## 2022-08-06 VITALS — WEIGHT: 304.9 LBS | HEIGHT: 74 IN

## 2022-08-06 VITALS
DIASTOLIC BLOOD PRESSURE: 92 MMHG | OXYGEN SATURATION: 96 % | HEART RATE: 64 BPM | TEMPERATURE: 98 F | SYSTOLIC BLOOD PRESSURE: 124 MMHG | RESPIRATION RATE: 16 BRPM

## 2022-08-06 DIAGNOSIS — I10 ESSENTIAL (PRIMARY) HYPERTENSION: ICD-10-CM

## 2022-08-06 DIAGNOSIS — K08.89 OTHER SPECIFIED DISORDERS OF TEETH AND SUPPORTING STRUCTURES: ICD-10-CM

## 2022-08-06 DIAGNOSIS — E11.9 TYPE 2 DIABETES MELLITUS WITHOUT COMPLICATIONS: ICD-10-CM

## 2022-08-06 PROCEDURE — 99283 EMERGENCY DEPT VISIT LOW MDM: CPT

## 2022-08-06 RX ORDER — OXYCODONE AND ACETAMINOPHEN 5; 325 MG/1; MG/1
1 TABLET ORAL ONCE
Refills: 0 | Status: DISCONTINUED | OUTPATIENT
Start: 2022-08-06 | End: 2022-08-06

## 2022-08-06 RX ADMIN — OXYCODONE AND ACETAMINOPHEN 1 TABLET(S): 5; 325 TABLET ORAL at 03:52

## 2022-08-06 NOTE — ED ADULT TRIAGE NOTE - CHIEF COMPLAINT QUOTE
complains of worsening pain to left side face that started yesterday as toothache to left upper molar.  pain and swelling spreading over left cheek throughout night. states pain is severe and unable to sleep because of it. has appointment to see dentist tomorrow but concerned about worsening pain/swelling. no airway compromise, respirations unlabored.

## 2022-08-06 NOTE — ED ADULT NURSE NOTE - OBJECTIVE STATEMENT
Pt present to ED w c/o worsening tooth pain that started yesterday (left upper molar). pain and swelling spreading over left cheek throughout night. pain is severe and causing him no sleep. Pt states he will make a dentist appt for Monday he doesn't believe his dentists is open on the weekend. no airway compromise, respirations unlabored. No other complaints at this time.

## 2022-08-06 NOTE — ED PROVIDER NOTE - OBJECTIVE STATEMENT
57 male with history of hypertension and diabetes presents with left-sided dental pain.  Patient states his tooth has been throbbing for the past 2 days has been taking ibuprofen for pain without much improvement.  Plan does he is asked his dentist this weekend no fevers.  Pain is extending into his left face and forehead.  No difficulty eating or drinking

## 2022-08-06 NOTE — ED PROVIDER NOTE - ENMT, MLM
Airway patent, Nasal mucosa clear. Mouth with normal mucosa. + left upper molar dental pain. no swelling or drainage. no trismus. Throat has no vesicles, no oropharyngeal exudates and uvula is midline.

## 2022-08-06 NOTE — ED PROVIDER NOTE - NSFOLLOWUPINSTRUCTIONS_ED_ALL_ED_FT
You can take ibuprofen as needed for pain, 600mg every 6-8 hours, take with food.  Please take Percocet as prescribed.  Caution this medication can make you drowsy

## 2022-08-06 NOTE — ED PROVIDER NOTE - CROS ED GI ALL NEG
Pt mom called and she said that the pharmacy needed's the ICD Code for the [facility] ER.        
Writer left message for pharmacy with ICD10 code of F90.2.  
negative...

## 2022-08-06 NOTE — ED PROVIDER NOTE - CLINICAL SUMMARY MEDICAL DECISION MAKING FREE TEXT BOX
57-year-old male with dental pain.  Will treat with pain medication.  Noninfected appearing.  Outpatient follow-up with his dentist

## 2024-01-24 NOTE — ED PROVIDER NOTE - PRINCIPAL DIAGNOSIS
----- Message from Lenin Chapman CNP sent at 1/24/2024 12:19 PM CST -----  Please let patient know covid/flu  negative. No change with treatment plan and may return to work tomorrow.   
Pt called and advised of message below. Pt agreeable to plan. Pt encouraged to call clinic with any questions/concerns. Pt verbalized understanding of information provided and denies any further questions/concerns at this time.     
Lower GI bleed